# Patient Record
Sex: FEMALE | Race: WHITE
[De-identification: names, ages, dates, MRNs, and addresses within clinical notes are randomized per-mention and may not be internally consistent; named-entity substitution may affect disease eponyms.]

---

## 2018-02-22 ENCOUNTER — HOSPITAL ENCOUNTER (OUTPATIENT)
Dept: HOSPITAL 89 - RAD | Age: 68
End: 2018-02-22
Attending: INTERNAL MEDICINE
Payer: MEDICARE

## 2018-02-22 DIAGNOSIS — Z13.820: Primary | ICD-10-CM

## 2018-02-22 DIAGNOSIS — Z78.0: ICD-10-CM

## 2018-02-22 PROCEDURE — 77080 DXA BONE DENSITY AXIAL: CPT

## 2018-02-22 NOTE — RADIOLOGY IMAGING REPORT
FACILITY: [facility] 

 

PATIENT NAME: Flor Daily

: 1950

MR: [id number]

V: [id number]

EXAM DATE: 

ORDERING PHYSICIAN: OBIE REA

TECHNOLOGIST: 

 

Location: [facility]

Patient: Flor Daily

: 1950

MRN: [id number]

Visit/Account:[id number]

Date of Sevice:  2018

 

ACCESSION #: [id number]

 

DEXA Scan

 

Clinical history:  Asymptomatic postmenopausal estrogen deficiency.

 

Comparison: None available.

 

LUMBAR SPINE:

The bone mineral density (BMD) measured from L1-L4 correlates with a Z-score 1.5 and a T-score of 0.3
 which is Normal as defined by the World Health Organization.  The corresponding risk of fracture in 
the lumbar spine is Not increased compared with a young adult reference population.

 

HIP:

Bone mineral density (BMD) measured in the Left total hip region correlates with a Z-score 0.2 and a 
T-score of by 0.8 which is Normal as defined by the World Health Organization.  The corresponding ris
k of fracture in the hip is 1-2 times increased compared with a young adult reference population.   T
 score left femoral neck -1.6

 

Bone mineral density (BMD) measured in the Femoral Neck region measures 0.821 g/cm2.

 

Impression:

1.  Lumbar spine:  Normal.

2.  Left Hip:  Normal.

3.  Femoral Neck:  Bone Mineral Density is 0.821 g/cm2

 

The next DEXA scan of this patient should include the following sites: L1-L4 and the left hip.

 

FRAX? WHO Fracture Risk Assessment Tool link:

<http://www.shef.ac.uk/FRAX/tool.jsp?locationValue=9>

 

PLEASE NOTE:

1)  The World Health Organization defines low BMD as follows:

                                                                       T-score

                   Normal                                  > -1

                   Osteopenia                           < -1 and  > -2.5

                   Osteoporosis                         < -2.5 without fractures

                   Established osteoporosis       < -2.5 with fractures

2)  In general, you may wish to consider:

                    Diagnosis                  Treatment                       Follow-up DEXA

 

                    Normal BMD            Prevention                      2-3 years

                    Osteopenia                Prevention/therapy         1-2 years

                    Osteoporosis             Therapy                           Yearly

3)  Fracture risk estimated from the T-score is more accurate for vertebral fractures (often spontane
ous) than for hip fractures.

 

 

Report Dictated By: Meryl Boudreaux MD at 2018 10:48 AM

 

Report E-Signed By: Meryl Boudreaux MD  at 2018 10:49 AM

 

WSN:[facility]

## 2018-10-16 ENCOUNTER — HOSPITAL ENCOUNTER (OUTPATIENT)
Dept: HOSPITAL 89 - MAMO | Age: 68
End: 2018-10-16
Attending: INTERNAL MEDICINE
Payer: MEDICARE

## 2018-10-16 DIAGNOSIS — Z12.31: Primary | ICD-10-CM

## 2018-10-16 PROCEDURE — 77067 SCR MAMMO BI INCL CAD: CPT

## 2018-10-16 PROCEDURE — 77063 BREAST TOMOSYNTHESIS BI: CPT

## 2018-10-17 NOTE — RADIOLOGY IMAGING REPORT
FACILITY: [facility] 

 

PATIENT NAME: JOHN MARCUM

: [id number]

MR: [id number]

V: [id number]

EXAM DATE: [id number]

ORDERING PHYSICIAN: OBIE REA

TECHNOLOGIST: Yanira Saenz

 

PROCEDURE:BILATERAL DIGITAL SCREENING MAMMOGRAM WITH CAD ASSISTED 

INTERPRETATION & 3D TOMOSYNTHESIS 

 

COMPARISON:Prior mammograms 10/13/17, 16, 9/9/15, 14, 

13.

 

INDICATIONS:Screening

 

FINDINGS:  

Moderately dense fibroglandular tissue is seen throughout the breasts. 

The parenchymal pattern has remained stable allowing for difference in 

mammographic technique & patient positioning. There is no evidence of 

malignant appearing mass, malignant appearing calcifications or other 

secondary sign of malignancy in either breast.

 

DIAGNOSTIC CATEGORY 1--NEGATIVE.  

 

RECOMMENDATIONS:

ROUTINE MAMMOGRAM AND CLINICAL EVALUATION.   

 

IMPRESSION:

BIRADS 1: Negative. 

No significant abnormality is seen.

 

 

 

 

 

 

 

 

 

Dictated by:  Meryl Boudreaux M.D. on 10/16/2018 at 15:18   

Transcribed by: FIX on 10/16/2018 at 15:24    

Approved by:  Meryl Boudreaux M.D. on 10/17/2018 at 10:03   

[facility]